# Patient Record
Sex: MALE | Race: WHITE | NOT HISPANIC OR LATINO | URBAN - METROPOLITAN AREA
[De-identification: names, ages, dates, MRNs, and addresses within clinical notes are randomized per-mention and may not be internally consistent; named-entity substitution may affect disease eponyms.]

---

## 2021-06-22 ENCOUNTER — OFFICE VISIT (OUTPATIENT)
Dept: FAMILY MEDICINE | Facility: CLINIC | Age: 21
End: 2021-06-22
Payer: COMMERCIAL

## 2021-06-22 VITALS
TEMPERATURE: 97.1 F | BODY MASS INDEX: 21.85 KG/M2 | OXYGEN SATURATION: 96 % | HEIGHT: 74 IN | WEIGHT: 170.25 LBS | RESPIRATION RATE: 13 BRPM | HEART RATE: 79 BPM | DIASTOLIC BLOOD PRESSURE: 79 MMHG | SYSTOLIC BLOOD PRESSURE: 123 MMHG

## 2021-06-22 DIAGNOSIS — Z11.59 ENCOUNTER FOR HEPATITIS C SCREENING TEST FOR LOW RISK PATIENT: ICD-10-CM

## 2021-06-22 DIAGNOSIS — Z11.3 SCREEN FOR STD (SEXUALLY TRANSMITTED DISEASE): ICD-10-CM

## 2021-06-22 DIAGNOSIS — Z13.220 SCREENING FOR LIPID DISORDERS: ICD-10-CM

## 2021-06-22 DIAGNOSIS — Z11.4 SCREENING FOR HIV (HUMAN IMMUNODEFICIENCY VIRUS): ICD-10-CM

## 2021-06-22 DIAGNOSIS — Z00.00 ANNUAL PHYSICAL EXAM: Primary | ICD-10-CM

## 2021-06-22 LAB
BUN SERPL-MCNC: 9 MG/DL (ref 5–24)
CALCIUM SERPL-MCNC: 9.6 MG/DL (ref 8.5–10.4)
CHLORIDE SERPLBLD-SCNC: 105 MMOL/L (ref 94–109)
CHOLEST SERPL-MCNC: 168 MG/DL (ref 0–200)
CHOLEST/HDLC SERPL: 2.7 {RATIO} (ref 0–5)
CO2 SERPL-SCNC: 31 MMOL/L (ref 20–32)
CREAT SERPL-MCNC: 0.9 MG/DL (ref 0.8–1.5)
EGFR CALCULATED (NON BLACK REFERENCE): 112.2
FASTING SPECIMEN: NO
GLUCOSE SERPL-MCNC: 105 MG/DL (ref 60–99)
HDLC SERPL-MCNC: 62 MG/DL
LDLC SERPL CALC-MCNC: 87 MG/DL (ref 0–129)
POTASSIUM SERPL-SCNC: 4.7 MMOL/L (ref 3.4–5.3)
SODIUM SERPL-SCNC: 140 MMOL/L (ref 137.3–146.3)
TRIGL SERPL-MCNC: 93 MG/DL (ref 0–150)
VLDL-CHOLESTEROL: 19 (ref 7–32)

## 2021-06-22 SDOH — HEALTH STABILITY: MENTAL HEALTH: HOW MANY STANDARD DRINKS CONTAINING ALCOHOL DO YOU HAVE ON A TYPICAL DAY?: 1 OR 2

## 2021-06-22 SDOH — HEALTH STABILITY: MENTAL HEALTH: HOW OFTEN DO YOU HAVE A DRINK CONTAINING ALCOHOL?: 2-3 TIMES A WEEK

## 2021-06-22 SDOH — HEALTH STABILITY: MENTAL HEALTH: HOW OFTEN DO YOU HAVE 6 OR MORE DRINKS ON ONE OCCASION?: WEEKLY

## 2021-06-22 ASSESSMENT — MIFFLIN-ST. JEOR: SCORE: 1842

## 2021-06-22 NOTE — PATIENT INSTRUCTIONS
"TRENTON Lloyd is a healthy 21 yo, here for an annual exam. His first visit to our clinic    ASSESSMENT/PLAN:    1. Discussed healthy lifestyle issues, e.g. Minimize alcohol use, try to eat veggies daily, exercise 5 days/week, including strength training at least 2 days/week.   2. Check labs  3. Sign up for the \"mychart\" system      Cam Sharma MD, MS  [facility] Department of Family Medicine and Community Health  "

## 2021-06-22 NOTE — PROGRESS NOTES
Jah Sandra presents to [facility] today to have an annual exam.  He reports no specific concerns.  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  He is currently entertaining some job offers.    Denies any chronic medical problems.    Lifestyle habits and Preventive health issues:     Physical activity - About 5 days/week. When he runs, can be up to 6 miles.     Diet - about 2-3 veggies/day. Often does not eat breakfast.     His sleep is good, 7-8 hours.     Alcohol intake involves about 3-4 nights/week and 2-3 drinks/night, occasionally more. He plans to decrease the number of nights drinking/week.     He does not use tobacco products. No THC      Wears seat belt.--Yes.  Wears bike helmet--Yes. .      MALE ROS  Heterosexual.   Partner: None now.   Contraception: Condoms  STD concerns: No concerns    His last dental check up was about 1.5 years ago. Suggested returning to Dentist soon.       ROS  PHQ-2 Score:     PHQ-2 ( 1999 Pfizer) 6/22/2021   Q1: Little interest or pleasure in doing things 0   Q2: Feeling down, depressed or hopeless 0   PHQ-2 Score 0       CONSTITUTIONAL:NEGATIVE for fever, chills, change in weight  INTEGUMENTARY/SKIN: NEGATIVE for worrisome rashes, moles or lesions  EYES: NEGATIVE for vision changes or irritation  ENT/MOUTH: NEGATIVE for ear, mouth and throat problems  RESP:NEGATIVE for significant cough or SOB  CV: NEGATIVE for chest pain, palpitations, SCHAEFFER, orthopnea, PND  or peripheral edema  GI: NEGATIVE for nausea, abdominal pain, heartburn, or change in bowel habits  :NEGATIVE for frequency, dysuria, or hematuria  MUSCULOSKELETAL:NEGATIVE for significant arthralgias or myalgia  NEURO: NEGATIVE for weakness, dizziness or paresthesias  ENDOCRINE: NEGATIVE for polyuria/dipsia,  temperature intolerance, skin/hair changes  HEME/ALLERGY/IMMUNE: NEGATIVE for bleeding problems  PSYCHIATRIC: NEGATIVE for changes in mood or  affect        No current outpatient medications on file.     Not on File    Health Maintenance   Topic Date Due     PREVENTIVE CARE VISIT  Never done     ADVANCE CARE PLANNING  Never done     HIV SCREENING  Never done     HPV IMMUNIZATION (3 - Male 3-dose series) 07/19/2014     HEPATITIS C SCREENING  Never done     DTAP/TDAP/TD IMMUNIZATION (5 - Td) 09/13/2026     PHQ-2  Completed     INFLUENZA VACCINE  Completed     IPV IMMUNIZATION  Completed     HEPATITIS B IMMUNIZATION  Completed     COVID-19 Vaccine  Completed     Pneumococcal Vaccine: Pediatrics (0 to 5 Years) and At-Risk Patients (6 to 64 Years)  Aged Out     MENINGITIS IMMUNIZATION  Aged Out         There is no problem list on file for this patient.      History reviewed. No pertinent surgical history.    Family History   Problem Relation Age of Onset     No Known Problems Mother      Multiple Sclerosis Father      No Known Problems Brother      Breast Cancer Maternal Grandmother      Heart Disease Paternal Grandfather      Diabetes No family hx of          Immunizations are as follows:      Immunization History   Administered Date(s) Administered     COVID-19,PF,Pfizer 04/19/2021, 05/13/2021     Comvax (HIB/HepB) 1998, 1998, 10/19/1999     DTAP (<7y) 1998, 1998, 01/18/1999, 10/19/1999, 08/25/2003     FLU 6-35 months 11/28/2008, 11/25/2011, 08/20/2012     HPV Quadrivalent 07/18/2013, 01/02/2014, 03/19/2014     HepA-ped 2 Dose 08/20/2012, 07/18/2013     Influenza (H1N1) 12/19/2009     Influenza Intranasal Vaccine 10/18/2007, 10/29/2009, 08/16/2010     Influenza Intranasal Vaccine 4 valent 09/08/2014     Influenza Vaccine IM > 6 months Valent IIV4 10/08/2013, 09/03/2015, 09/13/2016, 10/26/2017, 10/25/2018, 11/24/2020     MMR 10/19/1999, 07/29/2002     Meningococcal (Bexsero ) 09/03/2015, 09/13/2016     Meningococcal (Menactra ) 07/24/2009, 08/20/2012     Poliovirus, inactivated (IPV) 1998, 1998, 01/18/1999, 08/25/2003      "Tdap (Adacel,Boostrix) 08/25/2003, 07/24/2009, 09/13/2016     Varicella 08/18/2000, 07/24/2009           EXAM  /79 (BP Location: Right arm, Patient Position: Sitting, Cuff Size: Adult Regular)   Pulse 79   Temp 97.1  F (36.2  C) (Skin)   Resp 13   Ht 1.88 m (6' 2\")   Wt 77.2 kg (170 lb 4 oz)   SpO2 96%   BMI 21.86 kg/m    Physically fit and well-appearing 20-year-old in no distress    HEENT: Normal with clear oropharynx, normal tympanic membranes.  Neck was supple. No adenopathy, thyroid normal to palpation  RESP: lungs clear to auscultation bilaterally  Axillae: no palpable axillary masses or adenopathy  CV: regular rate and rhythm, normal S1 S2, no murmur, no carotid bruits  ABDOMEN: soft, nontender, without HSM or masses. Bowel sounds normal  : Normal male.  Testes down.  No masses.  No hernias.  Rectal exam: deferred  MS: Extremities normal- no gross deformities noted, no tender, hot or swollen joints.   SKIN: no suspicious lesions or rashes  NEURO: Normal strength and tone, sensory exam grossly normal  PSYCH: mentation appears normal. and affect normal/bright.  EXT: no peripheral edema        IMPRESSION  Gabino is a healthy 21 yo, here for an annual exam. His first visit to our clinic    ASSESSMENT/PLAN:    1. Discussed healthy lifestyle issues, e.g. Minimize alcohol use, try to eat veggies daily, exercise 5 days/week, including strength training at least 2 days/week.   2. Check labs  3. Sign up for the \"mychart\" system      Cam Sharma MD, MS  [facility] Department of Family Medicine and Community Health        "

## 2021-06-22 NOTE — NURSING NOTE
"20 year old  No chief complaint on file.      Blood pressure 123/79, pulse 79, temperature 97.1  F (36.2  C), temperature source Skin, resp. rate 13, height 1.88 m (6' 2\"), weight 77.2 kg (170 lb 4 oz), SpO2 96 %. Body mass index is 21.86 kg/m .  There is no problem list on file for this patient.      Wt Readings from Last 2 Encounters:   06/22/21 77.2 kg (170 lb 4 oz)     BP Readings from Last 3 Encounters:   06/22/21 123/79         No current outpatient medications on file.     No current facility-administered medications for this visit.        Social History     Tobacco Use     Smoking status: Never Smoker     Smokeless tobacco: Never Used   Substance Use Topics     Alcohol use: Yes     Frequency: 2-3 times a week     Drinks per session: 1 or 2     Binge frequency: Weekly     Drug use: Not Currently       Health Maintenance Due   Topic Date Due     PREVENTIVE CARE VISIT  Never done     ADVANCE CARE PLANNING  Never done     HIV SCREENING  Never done     HPV IMMUNIZATION (3 - Male 3-dose series) 07/19/2014     HEPATITIS C SCREENING  Never done       No results found for: PAP      June 22, 2021 2:26 PM    "

## 2021-06-23 LAB
C TRACH DNA SPEC QL NAA+PROBE: NEGATIVE
HCV AB SERPL QL IA: NONREACTIVE
HIV 1+2 AB+HIV1 P24 AG SERPL QL IA: NONREACTIVE
N GONORRHOEA DNA SPEC QL NAA+PROBE: NEGATIVE
SPECIMEN SOURCE: NORMAL
SPECIMEN SOURCE: NORMAL
T PALLIDUM AB SER QL: NONREACTIVE

## 2021-10-17 ENCOUNTER — HEALTH MAINTENANCE LETTER (OUTPATIENT)
Age: 21
End: 2021-10-17

## 2022-07-24 ENCOUNTER — HEALTH MAINTENANCE LETTER (OUTPATIENT)
Age: 22
End: 2022-07-24

## 2022-10-03 ENCOUNTER — HEALTH MAINTENANCE LETTER (OUTPATIENT)
Age: 22
End: 2022-10-03

## 2023-07-26 ENCOUNTER — OFFICE VISIT (OUTPATIENT)
Dept: FAMILY MEDICINE | Facility: CLINIC | Age: 23
End: 2023-07-26
Payer: COMMERCIAL

## 2023-07-26 VITALS
DIASTOLIC BLOOD PRESSURE: 80 MMHG | HEIGHT: 75 IN | OXYGEN SATURATION: 98 % | RESPIRATION RATE: 17 BRPM | TEMPERATURE: 98.4 F | HEART RATE: 73 BPM | WEIGHT: 161.5 LBS | BODY MASS INDEX: 20.08 KG/M2 | SYSTOLIC BLOOD PRESSURE: 128 MMHG

## 2023-07-26 DIAGNOSIS — I45.9 SKIPPED HEART BEATS: ICD-10-CM

## 2023-07-26 DIAGNOSIS — Z00.00 ROUTINE GENERAL MEDICAL EXAMINATION AT A HEALTH CARE FACILITY: Primary | ICD-10-CM

## 2023-07-26 ASSESSMENT — ENCOUNTER SYMPTOMS
CONSTIPATION: 0
PARESTHESIAS: 0
CHILLS: 0
NERVOUS/ANXIOUS: 0
HEADACHES: 0
HEMATURIA: 0
SHORTNESS OF BREATH: 0
DYSURIA: 0
SORE THROAT: 0
HEMATOCHEZIA: 0
ABDOMINAL PAIN: 0
DIARRHEA: 0
FREQUENCY: 0
JOINT SWELLING: 0
MYALGIAS: 0
WEAKNESS: 0
FEVER: 0
EYE PAIN: 0
NAUSEA: 0
HEARTBURN: 0
ARTHRALGIAS: 0
DIZZINESS: 0
PALPITATIONS: 0
COUGH: 0

## 2023-07-26 NOTE — PROGRESS NOTES
Jah Sandra presents to [facility] today to have an annual exam.     ASSESSMENT/PLAN:    Annual Exam/Preventive Issues   -Reviewed labs from 2021 with normal Lipids and chemistry panel. Glucose was 105 but he had not been fasting.   -Up to date on immunizations except for no records of HPV. He believes that he had this vaccine in childhood. He's now 22 yo and in a steady relationship. We discussed that vaccination is recommended until about age 25 yo so he's nearly there. We deferred today and he will check to see if he's had this vaccination in the past.     -Specific concerns:     Irregular heart beat with symptoms     - Reviewed ECG with Sinus rhythm at 64 but with some rate variation and non-specific QRS widening.   -Will assess with Ziopatch and then a visit with Cardiology. Referrals sent    -Follow up: in a year or sooner if needed.     Cam Sharma MD  Family Medicine and Sports Medicine    Introduction: This is my second time seeing Gabino.  The first was for an annual exam 2 years ago in June 2021.  At that time he was hoping to decrease his drinking. Has decreased it a bit.   He's feeling well except for noticing irregular heartbeats on occasions. States this was the worst in 2018-19 when he'd notice some skipped beats and then a racing heart. This is accompanied by lightheadedness. In past, he noticed that running helped minimize these events.         There is no problem list on file for this patient.      Current Medications include:   No current outpatient medications on file.     No Known Allergies    Social  Social History     Social History Narrative    From [place]. Went to [employer] then to [employer]. Majored in Mechanical Engineering.     Enjoys running or playing music         Lifestyle habits and Preventive health issues:     Physical activity Running and rock climbing about 3 times/week.   Diet is healthy    Alcohol intake involves about 2 nights/week and 3 drinks/night.    He  does not use tobacco products.   His sleep is good.       MALE ROS  Partner: Monserrat  Contraception: IUD  STD concerns: None. They are monogamous    Dental -- has an upcoming appt.     KAYE  PHQ-2 Score:         6/22/2021     2:22 PM   PHQ-2 ( 1999 Pfizer)   Q1: Little interest or pleasure in doing things 0   Q2: Feeling down, depressed or hopeless 0   PHQ-2 Score 0   PHQ-2 Total Score (12-17 Years)- Positive if 3 or more points; Administer PHQ-A if positive 0     Major other issues mentioned above. Otherwise, see patient intake questionnaire.       Health Maintenance   Topic Date Due    ADVANCE CARE PLANNING  Never done    HPV IMMUNIZATION (3 - Male 3-dose series) 07/19/2014    PHQ-2 (once per calendar year)  01/01/2023    INFLUENZA VACCINE (1) 09/01/2023    YEARLY PREVENTIVE VISIT  07/26/2024    DTAP/TDAP/TD IMMUNIZATION (8 - Td or Tdap) 09/13/2026    HEPATITIS C SCREENING  Completed    HIV SCREENING  Completed    IPV IMMUNIZATION  Completed    HEPATITIS B IMMUNIZATION  Completed    COVID-19 Vaccine  Completed    Pneumococcal Vaccine: Pediatrics (0 to 5 Years) and At-Risk Patients (6 to 64 Years)  Aged Out    MENINGITIS IMMUNIZATION  Aged Out         No past surgical history on file.    Family History   Problem Relation Age of Onset    No Known Problems Mother     Multiple Sclerosis Father     No Known Problems Brother     Breast Cancer Maternal Grandmother     Heart Disease Paternal Grandfather     Diabetes No family hx of          Immunizations are as follows:      Immunization History   Administered Date(s) Administered    COVID-19 Bivalent 18+ (Moderna) 11/30/2022    COVID-19 MONOVALENT 12+ (Pfizer) 04/19/2021, 05/13/2021    Comvax (HIB/HepB) 1998, 1998, 10/19/1999    DTAP (<7y) 1998, 1998, 01/18/1999, 10/19/1999, 08/25/2003    FLU 6-35 months 11/28/2008, 11/25/2011, 08/20/2012    HEPATITIS A (PEDS 12M-18Y) 08/20/2012, 07/18/2013    HPV Quadrivalent 07/18/2013, 01/02/2014, 03/19/2014     "Influenza (H1N1) 12/19/2009    Influenza Intranasal Vaccine 10/18/2007, 10/29/2009, 08/16/2010    Influenza Vaccine >6 months (Alfuria,Fluzone) 10/08/2013, 09/03/2015, 09/13/2016, 10/26/2017, 10/25/2018, 11/24/2020, 12/16/2021, 11/30/2022    MMR 10/19/1999, 07/29/2002    Meningococcal ACWY (Menactra ) 07/24/2009, 08/20/2012    Meningococcal B (Bexsero ) 09/03/2015, 09/13/2016    Nasal Influenza Vaccine 2-49 (FluMist) 09/08/2014    Poliovirus, inactivated (IPV) 1998, 1998, 01/18/1999, 08/25/2003    TDAP (Adacel,Boostrix) 08/25/2003, 07/24/2009, 09/13/2016    Varicella 08/18/2000, 07/24/2009         EXAM  /80 (BP Location: Left arm, Patient Position: Sitting, Cuff Size: Adult Regular)   Pulse 73   Temp 98.4  F (36.9  C) (Temporal)   Resp 17   Ht 1.892 m (6' 2.5\")   Wt 73.3 kg (161 lb 8 oz)   SpO2 98%   BMI 20.46 kg/m      GENERAL APPEARANCE: Appears well.   HEENT: neck is supple. No adenopathy, thyroid normal to palpation  RESP: Lungs clear to auscultation bilaterally.  Axillae: no palpable axillary masses or adenopathy  CV: regular rate and rhythm, normal S1 S2, no murmur, no carotid bruits  ABDOMEN: soft, nontender, without HSM or masses. Bowel sounds normal  : Normal male genitalia. No masses. No hernias  Rectal exam: deferred  MS: Gait - normal. Extremities with generally normal range of motion. No deformities noted other than Slightly limited IROM of both hips.   SKIN: No suspicious lesions or rashes  NEURO: Normal strength and tone, sensory exam grossly normal  PSYCH: mentation and affect appear normal.   EXT: No peripheral edema      ECG done in clinic today with NSR at 64 bpm.   Right axis deviation with some rate variation per computer read and also non-specific QRS widening.     SEE TOP OF NOTE FOR ASSESSMENT AND PLAN      Cam Sharma MD  Family Medicine and Sports Medicine  [facility] Department of Family Medicine and Community Health      Answers submitted by the " patient for this visit:  Annual Preventive Visit (Submitted on 7/26/2023)  Chief Complaint: Annual Exam:  Frequency of exercise:: 2-3 days/week.    Getting at least 3 servings of Calcium per day:: Yes  Diet:: Vegetarian/vegan  Taking medications regularly:: Yes  Medication side effects:: Not applicable  Bi-annual eye exam:: NO  Dental care twice a year:: NO  Sleep apnea or symptoms of sleep apnea:: None  abdominal pain: No  Blood in stool: No  Blood in urine: No  chest pain: No  chills: No  congestion: No  constipation: No  cough: No  diarrhea: No  dizziness: No  ear pain: No  eye pain: No  nervous/anxious: No  fever: No  frequency: No  genital sores: No  headaches: No  hearing loss: No  heartburn: No  arthralgias: No  joint swelling: No  peripheral edema: No  mood changes: No  myalgias: No  nausea: No  dysuria: No  palpitations: No  Skin sensation changes: No  sore throat: No  urgency: No  rash: No  shortness of breath: No  visual disturbance: No  weakness: No  impotence: No  penile discharge: No  Additional concerns today:: Yes  Exercise outside of work (Submitted on 7/26/2023)  Chief Complaint: Annual Exam:  Duration of exercise:: 45-60 minutes

## 2023-07-26 NOTE — NURSING NOTE
"23 year old  Chief Complaint   Patient presents with    Physical     --Has noticed over the past few years his heart will skip beats, would feel lightheaded at times. Started running after first experiencing sx 3 years ago, now noticing less skipped beats. Will still feel lightheaded sometimes with tachycardia.   --a couple of weeks ago left armpit lymph nodes swollen, about 1\" in size stayed for two weeks, has been gone for about a week.       Blood pressure 128/80, pulse 73, temperature 98.4  F (36.9  C), temperature source Temporal, resp. rate 17, height 1.892 m (6' 2.5\"), weight 73.3 kg (161 lb 8 oz), SpO2 98 %. Body mass index is 20.46 kg/m .  There is no problem list on file for this patient.      Wt Readings from Last 2 Encounters:   07/26/23 73.3 kg (161 lb 8 oz)   06/22/21 77.2 kg (170 lb 4 oz)     BP Readings from Last 3 Encounters:   07/26/23 128/80   06/22/21 123/79         No current outpatient medications on file.     No current facility-administered medications for this visit.       Social History     Tobacco Use    Smoking status: Never    Smokeless tobacco: Never   Substance Use Topics    Alcohol use: Yes     Alcohol/week: 5.0 standard drinks of alcohol     Types: 5 Standard drinks or equivalent per week    Drug use: Not Currently       Health Maintenance Due   Topic Date Due    ADVANCE CARE PLANNING  Never done    HPV IMMUNIZATION (3 - Male 3-dose series) 07/19/2014    YEARLY PREVENTIVE VISIT  06/22/2022    PHQ-2 (once per calendar year)  01/01/2023       No results found for: PAP      July 26, 2023 9:52 AM    "

## 2023-07-26 NOTE — PATIENT INSTRUCTIONS
Annual Exam/Preventive Issues   -Reviewed labs from 2021 with normal Lipids and chemistry panel. Glucose was 105 but he had not been fasting.   -Up to date on immunizations except for no records of HPV. He believes that he had this vaccine in childhood. He's now 24 yo and in a steady relationship. We discussed that vaccination is recommended until about age 25 yo so he's nearly there. We deferred today and he will check to see if he's had this vaccination in the past.     -Specific concerns:     Irregular heart beat with symptoms     - Reviewed ECG with Sinus rhythm at 64 but with some rate variation and non-specific QRS widening.   -Will assess with Ziopatch and then a visit with Cardiology. Referrals sent    -Follow up: in a year or sooner if needed.     Cam Sharma MD  Family Medicine and Sports Medicine      Preventive Health Recommendations  Male Ages 21 - 25     Yearly exam:             See your health care provider every year in order to  o   Review health changes.   o   Discuss preventive care.    o   Review your medicines if your doctor has prescribed any.  You should be tested each year for STDs (sexually transmitted diseases).   Talk to your provider about cholesterol testing.    If you are at risk for diabetes, you should have a diabetes test (fasting glucose).    Shots: Get a flu shot each year. Get a tetanus shot every 10 years.     Nutrition:  Eat at least 5 servings of fruits and vegetables daily.   Eat whole-grain bread, whole-wheat pasta and brown rice instead of white grains and rice.   Get adequate calcium and Vitamin D.     Lifestyle  Exercise for at least 150 minutes a week (30 minutes a day, 5 days a week). This will help you control your weight and prevent disease.   Limit alcohol to one drink per day.   No smoking.   Wear sunscreen to prevent skin cancer.   See your dentist every six months for an exam and cleaning.

## 2023-07-31 ENCOUNTER — HOSPITAL ENCOUNTER (OUTPATIENT)
Dept: CARDIOLOGY | Facility: CLINIC | Age: 23
Discharge: HOME | End: 2023-07-31
Attending: FAMILY MEDICINE | Admitting: FAMILY MEDICINE
Payer: COMMERCIAL

## 2023-07-31 DIAGNOSIS — I45.9 SKIPPED HEART BEATS: ICD-10-CM

## 2023-07-31 PROCEDURE — 93242 EXT ECG>48HR<7D RECORDING: CPT

## 2023-07-31 PROCEDURE — 93248 EXT ECG>7D<15D REV&INTERPJ: CPT | Performed by: FAMILY MEDICINE

## 2023-08-28 NOTE — PROGRESS NOTES
[facility]      Referring provider:Cam Sharma MD     HPI: Mr. Jah Sandra is a 23 year old  male with no significant past medical history is being seen today for irregular heartbeat sensation over the last 1 year or so.  Patient tells me that he first noticed irregular heartbeat ~ 5 to 6 years ago.  Then for few years he was feeling fine and did not notice them much.  Over the last 1 year he started feeling the skipped beats again.  Mostly at rest.  No specific triggers per patient.  He exercises regularly particular rockclimbing.  No exercise associated palpitations.  No chest pain, shortness of breath, dizziness, syncope or lower extremity edema.  Denies fast palpitations.    Does not smoke.  Alcohol 1-2 times a week 3-4 drinks.  No drug abuse.  He is not on any medications other than Zyrtec.  No cardiovascular risk factors.  No family history of premature CAD.     I reviewed patient's EKG 7/26/2023 which shows sinus rhythm, right axis deviation, with QRS duration slightly delayed at 160 ms.  HI interval is normal.  No preexcitation.    Reviewed patient's Zio patch for a week (7/31/2023) which shows symptoms corresponding to sinus rhythm with normal rate.  No abnormal cardiac arrhythmia tacky or bradycardia arrhythmia.    Medications, personal, family, and social history reviewed with patient and revised.    PAST MEDICAL HISTORY:  No past medical history on file.    CURRENT MEDICATIONS:  No current outpatient medications on file.       PAST SURGICAL HISTORY:  No past surgical history on file.    ALLERGIES:   No Known Allergies    FAMILY HISTORY:  Family History   Problem Relation Age of Onset    No Known Problems Mother     Multiple Sclerosis Father     No Known Problems Brother     Breast Cancer Maternal Grandmother     Heart Disease Paternal Grandfather     Diabetes No family hx of           SOCIAL HISTORY:  Social History     Tobacco Use    Smoking status: Never    Smokeless tobacco: Never   Substance Use Topics    Alcohol use: Yes     Alcohol/week: 5.0 standard drinks of alcohol     Types: 5 Standard drinks or equivalent per week    Drug use: Not Currently       ROS:   Constitutional: No fever, chills, or sweats. Weight stable.   Cardiovascular: As per HPI.       Exam:  /73 (BP Location: Left arm, Patient Position: Sitting, Cuff Size: Adult Regular)   Pulse 69   Wt 76.9 kg (169 lb 9.6 oz)   SpO2 96%   BMI 21.48 kg/m    GENERAL APPEARANCE: alert and no distress  HEENT: no icterus, no central cyanosis  LYMPH/NECK: no adenopathy, no asymmetry, JVP not elevated  RESPIRATORY: lungs clear to auscultation - no rales, rhonchi or wheezes, no use of accessory muscles, no retractions, respirations are unlabored, normal respiratory rate  CARDIOVASCULAR: regular rhythm, normal S1, S2, no S3 or S4 and no murmur, click or rub, precordium quiet with normal PMI.  GI: soft, non tender  EXTREMITIES: no edema  NEURO: alert, normal speech,and affect  SKIN: no ecchymoses, no rashes     I have reviewed the labs and personally reviewed the imaging below and made my comment in the assessment and plan.    Labs:  CBC RESULTS:   No results found for: WBC, RBC, HGB, HCT, MCV, MCH, MCHC, RDW, PLT    BMP RESULTS:  Lab Results   Component Value Date    .0 06/22/2021    POTASSIUM 4.7 06/22/2021    CHLORIDE 105.0 06/22/2021    CO2 31.0 06/22/2021    .0 (H) 06/22/2021    BUN 9.0 06/22/2021    CR 0.9 06/22/2021    SILVER 9.6 06/22/2021      Zio patch 7/31/2023: Normal.  Symptoms correspond to sinus rhythm    EKG 7/26/2023 shows sinus rhythm, right axis deviation, increased QRS duration at 116 ms    Echocardiogram  No results found for this or any previous visit (from the past 8760 hour(s)).      Assessment and Plan:     #Irregular heartbeat sensation  #Abnormal EKG  -No specific trigger for skipped beat  sensation. Zio patch shows sinus rhythm corresponding to symptoms.  PAC and PVC less than 1%.  No significant cardiac arrhythmia.  I reassured patient that his physical exam and Zio patch is normal.  I recommended transthoracic echocardiogram and updating blood tests.  In the meantime I also counseled him to avoid alcohol for a while and see if that helps to relieve his skipped feeling sensation.      Return to clinic as needed.      Total time spent today for this visit is 30 minutes including precharting, face-to-face clinic visit, review of labs/imaging and medical documentation.    Alfredito VELARDE MD  [facility] Division of Cardiology  Pager [phone]

## 2023-08-29 ENCOUNTER — OFFICE VISIT (OUTPATIENT)
Dept: CARDIOLOGY | Facility: CLINIC | Age: 23
End: 2023-08-29
Payer: COMMERCIAL

## 2023-08-29 VITALS
BODY MASS INDEX: 21.48 KG/M2 | OXYGEN SATURATION: 96 % | WEIGHT: 169.6 LBS | DIASTOLIC BLOOD PRESSURE: 73 MMHG | SYSTOLIC BLOOD PRESSURE: 112 MMHG | HEART RATE: 69 BPM

## 2023-08-29 DIAGNOSIS — R94.31 ABNORMAL ELECTROCARDIOGRAM: ICD-10-CM

## 2023-08-29 DIAGNOSIS — I45.9 SKIPPED HEART BEATS: Primary | ICD-10-CM

## 2023-08-29 LAB
ANION GAP SERPL CALCULATED.3IONS-SCNC: 12 MMOL/L (ref 7–15)
BUN SERPL-MCNC: 8.6 MG/DL (ref 6–20)
CALCIUM SERPL-MCNC: 9.3 MG/DL (ref 8.6–10)
CHLORIDE SERPL-SCNC: 102 MMOL/L (ref 98–107)
CREAT SERPL-MCNC: 0.78 MG/DL (ref 0.67–1.17)
DEPRECATED HCO3 PLAS-SCNC: 24 MMOL/L (ref 22–29)
ERYTHROCYTE [DISTWIDTH] IN BLOOD BY AUTOMATED COUNT: 11.9 % (ref 10–15)
GFR SERPL CREATININE-BSD FRML MDRD: >90 ML/MIN/1.73M2
GLUCOSE SERPL-MCNC: 82 MG/DL (ref 70–99)
HCT VFR BLD AUTO: 43.4 % (ref 40–53)
HGB BLD-MCNC: 14.4 G/DL (ref 13.3–17.7)
MCH RBC QN AUTO: 29.6 PG (ref 26.5–33)
MCHC RBC AUTO-ENTMCNC: 33.2 G/DL (ref 31.5–36.5)
MCV RBC AUTO: 89 FL (ref 78–100)
PLATELET # BLD AUTO: 242 10E3/UL (ref 150–450)
POTASSIUM SERPL-SCNC: 4.8 MMOL/L (ref 3.4–5.3)
RBC # BLD AUTO: 4.86 10E6/UL (ref 4.4–5.9)
SODIUM SERPL-SCNC: 138 MMOL/L (ref 136–145)
TSH SERPL DL<=0.005 MIU/L-ACNC: 0.92 UIU/ML (ref 0.3–4.2)
WBC # BLD AUTO: 4.8 10E3/UL (ref 4–11)

## 2023-08-29 PROCEDURE — 85027 COMPLETE CBC AUTOMATED: CPT | Performed by: INTERNAL MEDICINE

## 2023-08-29 PROCEDURE — 36415 COLL VENOUS BLD VENIPUNCTURE: CPT | Performed by: INTERNAL MEDICINE

## 2023-08-29 PROCEDURE — 99203 OFFICE O/P NEW LOW 30 MIN: CPT | Performed by: INTERNAL MEDICINE

## 2023-08-29 PROCEDURE — 84443 ASSAY THYROID STIM HORMONE: CPT | Performed by: INTERNAL MEDICINE

## 2023-08-29 PROCEDURE — 80048 BASIC METABOLIC PNL TOTAL CA: CPT | Performed by: INTERNAL MEDICINE

## 2023-08-29 NOTE — NURSING NOTE
"Chief Complaint   Patient presents with    New Patient     Reason for visit: New general cardiology for Skipped heart beats       Initial /73 (BP Location: Left arm, Patient Position: Sitting, Cuff Size: Adult Regular)   Pulse 69   Wt 76.9 kg (169 lb 9.6 oz)   SpO2 96%   BMI 21.48 kg/m   Estimated body mass index is 21.48 kg/m  as calculated from the following:    Height as of 7/26/23: 1.892 m (6' 2.5\").    Weight as of this encounter: 76.9 kg (169 lb 9.6 oz)..  BP completed using cuff size: regular    LADAN Hernandez    "

## 2023-08-29 NOTE — LETTER
8/29/2023      RE: Jah Sandra  [address]  [address]  [facility] [address]       Dear Colleague,    Thank you for the opportunity to participate in the care of your patient, Jah Sandra, at the [facility]. Please see a copy of my visit note below.                                                                                              [facility] Cardiology [facility]      Referring provider:Cam Sharma MD     HPI: Mr. Jah Sandra is a 23 year old  male with no significant past medical history is being seen today for irregular heartbeat sensation over the last 1 year or so.  Patient tells me that he first noticed irregular heartbeat ~ 5 to 6 years ago.  Then for few years he was feeling fine and did not notice them much.  Over the last 1 year he started feeling the skipped beats again.  Mostly at rest.  No specific triggers per patient.  He exercises regularly particular rockclimbing.  No exercise associated palpitations.  No chest pain, shortness of breath, dizziness, syncope or lower extremity edema.  Denies fast palpitations.    Does not smoke.  Alcohol 1-2 times a week 3-4 drinks.  No drug abuse.  He is not on any medications other than Zyrtec.  No cardiovascular risk factors.  No family history of premature CAD.     I reviewed patient's EKG 7/26/2023 which shows sinus rhythm, right axis deviation, with QRS duration slightly delayed at 160 ms.  LA interval is normal.  No preexcitation.    Reviewed patient's Zio patch for a week (7/31/2023) which shows symptoms corresponding to sinus rhythm with normal rate.  No abnormal cardiac arrhythmia tacky or bradycardia arrhythmia.    Medications, personal, family, and social history reviewed with patient and revised.    PAST MEDICAL HISTORY:  No past medical history on file.    CURRENT MEDICATIONS:  No current outpatient medications on file.       PAST  SURGICAL HISTORY:  No past surgical history on file.    ALLERGIES:   No Known Allergies    FAMILY HISTORY:  Family History   Problem Relation Age of Onset    No Known Problems Mother     Multiple Sclerosis Father     No Known Problems Brother     Breast Cancer Maternal Grandmother     Heart Disease Paternal Grandfather     Diabetes No family hx of          SOCIAL HISTORY:  Social History     Tobacco Use    Smoking status: Never    Smokeless tobacco: Never   Substance Use Topics    Alcohol use: Yes     Alcohol/week: 5.0 standard drinks of alcohol     Types: 5 Standard drinks or equivalent per week    Drug use: Not Currently       ROS:   Constitutional: No fever, chills, or sweats. Weight stable.   Cardiovascular: As per HPI.       Exam:  /73 (BP Location: Left arm, Patient Position: Sitting, Cuff Size: Adult Regular)   Pulse 69   Wt 76.9 kg (169 lb 9.6 oz)   SpO2 96%   BMI 21.48 kg/m    GENERAL APPEARANCE: alert and no distress  HEENT: no icterus, no central cyanosis  LYMPH/NECK: no adenopathy, no asymmetry, JVP not elevated  RESPIRATORY: lungs clear to auscultation - no rales, rhonchi or wheezes, no use of accessory muscles, no retractions, respirations are unlabored, normal respiratory rate  CARDIOVASCULAR: regular rhythm, normal S1, S2, no S3 or S4 and no murmur, click or rub, precordium quiet with normal PMI.  GI: soft, non tender  EXTREMITIES: no edema  NEURO: alert, normal speech,and affect  SKIN: no ecchymoses, no rashes     I have reviewed the labs and personally reviewed the imaging below and made my comment in the assessment and plan.    Labs:  CBC RESULTS:   No results found for: WBC, RBC, HGB, HCT, MCV, MCH, MCHC, RDW, PLT    BMP RESULTS:  Lab Results   Component Value Date    .0 06/22/2021    POTASSIUM 4.7 06/22/2021    CHLORIDE 105.0 06/22/2021    CO2 31.0 06/22/2021    .0 (H) 06/22/2021    BUN 9.0 06/22/2021    CR 0.9 06/22/2021    SILVER 9.6 06/22/2021      Zio patch 7/31/2023:  Normal.  Symptoms correspond to sinus rhythm    EKG 7/26/2023 shows sinus rhythm, right axis deviation, increased QRS duration at 116 ms    Echocardiogram  No results found for this or any previous visit (from the past 8760 hour(s)).      Assessment and Plan:     #Irregular heartbeat sensation  #Abnormal EKG  -No specific trigger for skipped beat sensation. Zio patch shows sinus rhythm corresponding to symptoms.  PAC and PVC less than 1%.  No significant cardiac arrhythmia.  I reassured patient that his physical exam and Zio patch is normal.  I recommended transthoracic echocardiogram and updating blood tests.  In the meantime I also counseled him to avoid alcohol for a while and see if that helps to relieve his skipped feeling sensation.      Return to clinic as needed.      Total time spent today for this visit is 30 minutes including precharting, face-to-face clinic visit, review of labs/imaging and medical documentation.    Alfredito VELARDE MD  [facility] Division of Cardiology  Pager [phone]

## 2023-08-29 NOTE — PATIENT INSTRUCTIONS
Thank you for coming to the [facility] at [place]; please note the following instructions:    1. Echocardiogram    2. Blood test today    3. Follow-up as needed in cardiology        If you have any questions regarding your visit, please contact your care team:     CARDIOLOGY  TELEPHONE NUMBER   Rubia MTZIsabel, Registered Nurse  Siobhan BERRY, Registered Nurse  Nell SHIRLEY, Registered Nurse  Veronica ERWIN, Registered Medical Assistant  Meena TORRES, Certified Medical Assistant  Kina FLORES, Visit Facilitator [phone] (select option 1)    *After hours: [phone]   For Scheduling Appts:     [phone] (select option 1)    *After hours: [phone]   For the Device Clinic (Pacemakers and ICD's)  Laurie TAM, Registered Nurse   During business hours: [phone]    *After business hours:  [phone] (select option 4)      Normal test result notifications will be released via M[employer] or mailed within 7 business days.  All other test results, will be communicated via telephone once reviewed by your cardiologist.    If you need a medication refill, please contact your pharmacy.  Please allow 3 business days for your refill to be completed.    As always, thank you for trusting us with your health care needs!

## 2023-08-29 NOTE — LETTER
August 30, 2023      Gabino Sandra  [address]  [address]  [facility] [address]        Dear ,    We are writing to inform you of your test results. Your test results fall within the expected range(s) or remain unchanged from previous results.  Please continue with current treatment plan.    Resulted Orders   CBC with platelets   Result Value Ref Range    WBC Count 4.8 4.0 - 11.0 10e3/uL    RBC Count 4.86 4.40 - 5.90 10e6/uL    Hemoglobin 14.4 13.3 - 17.7 g/dL    Hematocrit 43.4 40.0 - 53.0 %    MCV 89 78 - 100 fL    MCH 29.6 26.5 - 33.0 pg    MCHC 33.2 31.5 - 36.5 g/dL    RDW 11.9 10.0 - 15.0 %    Platelet Count 242 150 - 450 10e3/uL   TSH with free T4 reflex   Result Value Ref Range    TSH 0.92 0.30 - 4.20 uIU/mL   Basic metabolic panel   Result Value Ref Range    Sodium 138 136 - 145 mmol/L    Potassium 4.8 3.4 - 5.3 mmol/L    Chloride 102 98 - 107 mmol/L    Carbon Dioxide (CO2) 24 22 - 29 mmol/L    Anion Gap 12 7 - 15 mmol/L    Urea Nitrogen 8.6 6.0 - 20.0 mg/dL    Creatinine 0.78 0.67 - 1.17 mg/dL    Calcium 9.3 8.6 - 10.0 mg/dL    Glucose 82 70 - 99 mg/dL    GFR Estimate >90 >60 mL/min/1.73m2   If you have any questions or concerns, please call the clinic at the number listed above.   Sincerely,  Alfreidto Peralta MD

## 2023-09-22 ENCOUNTER — ANCILLARY PROCEDURE (OUTPATIENT)
Dept: CARDIOLOGY | Facility: CLINIC | Age: 23
End: 2023-09-22
Attending: INTERNAL MEDICINE
Payer: COMMERCIAL

## 2023-09-22 DIAGNOSIS — R94.31 ABNORMAL ELECTROCARDIOGRAM: ICD-10-CM

## 2023-09-22 LAB — LVEF ECHO: NORMAL

## 2023-09-22 PROCEDURE — 93306 TTE W/DOPPLER COMPLETE: CPT | Mod: GC | Performed by: INTERNAL MEDICINE

## 2024-10-05 ENCOUNTER — HEALTH MAINTENANCE LETTER (OUTPATIENT)
Age: 24
End: 2024-10-05

## 2024-10-23 ENCOUNTER — OFFICE VISIT (OUTPATIENT)
Dept: FAMILY MEDICINE | Facility: CLINIC | Age: 24
End: 2024-10-23
Payer: COMMERCIAL

## 2024-10-23 VITALS
DIASTOLIC BLOOD PRESSURE: 69 MMHG | HEIGHT: 74 IN | WEIGHT: 170 LBS | SYSTOLIC BLOOD PRESSURE: 115 MMHG | TEMPERATURE: 98.3 F | RESPIRATION RATE: 14 BRPM | OXYGEN SATURATION: 99 % | BODY MASS INDEX: 21.82 KG/M2 | HEART RATE: 61 BPM

## 2024-10-23 DIAGNOSIS — Z00.00 ANNUAL PHYSICAL EXAM: Primary | ICD-10-CM

## 2024-10-23 SDOH — HEALTH STABILITY: PHYSICAL HEALTH: ON AVERAGE, HOW MANY DAYS PER WEEK DO YOU ENGAGE IN MODERATE TO STRENUOUS EXERCISE (LIKE A BRISK WALK)?: 5 DAYS

## 2024-10-23 ASSESSMENT — SOCIAL DETERMINANTS OF HEALTH (SDOH): HOW OFTEN DO YOU GET TOGETHER WITH FRIENDS OR RELATIVES?: TWICE A WEEK

## 2024-10-23 NOTE — PATIENT INSTRUCTIONS
IMPRESSION  Healthy 25 yo doing well.     ASSESSMENT/PLAN:    Annual Exam/Preventive Issues   - Labs: Reviewed healthy labs from the past 2 years  - Immunizations: Up to date on all except Covid. He's had 4 previous covid vaccines, plus has had Covid in the past on a few occasions. Will defer  - Other recommendations:   Start taking Vit B12 as vegan diet primarily. Can be part of a multivitamin  Encouraged outdoor activity in wintertime  Schedule dentistry visit.       -Follow up: in 1-2 years.     Cam Sharma MD  Family Medicine and Sports Medicine

## 2024-10-23 NOTE — PROGRESS NOTES
Preventive Care Visit  [facility]      IMPRESSION  Healthy 25 yo doing well.     ASSESSMENT/PLAN:    Annual Exam/Preventive Issues   - Labs: Reviewed healthy labs from the past 2 years  - Immunizations: Up to date on all except Covid. He's had 4 previous covid vaccines, plus has had Covid in the past on a few occasions. Will defer  - Other recommendations:   Start taking Vit B12 as vegan diet primarily. Can be part of a multivitamin  Encouraged outdoor activity in wintertime  Schedule dentistry visit.     ADDENDUM:   I sent in a message about family-planning or contraception as it seemed he had questions about this on his questionnaire but he did not raise it during the visit.    -Follow up: in 1-2 years.     Cam Sharma MD  Family Medicine and Sports Medicine      INTRODUCTION:   Jah is here for an annual preventive exam.     He states that things are going well.  He and his longtime girlfriend have now moved into a new home together.  He is under some stress as he is changing jobs.  The heart palpitations that he had previously have all resolved.      Social History     Social History Narrative    From [place]. Went to [employer] then to [employer]. 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     Was working at [employer]. 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     Has a longtime girlfriend, Monserrat. They just bought a home together.     Enjoys running, biking and playing music.           10/23/2024   General Health   How would you rate your overall physical health? Good   Feel stress (tense, anxious, or unable to sleep) Rather much      (!) STRESS CONCERN      10/23/2024   Nutrition   Three or more servings of calcium each day? Yes   Diet: Vegetarian/vegan   How many servings of fruit and vegetables per day? (!) 2-3   How many sweetened beverages each day? 0-1            10/23/2024   Exercise   Days per week of moderate/strenous exercise 5 days            10/23/2024   Social Factors   Frequency of  gathering with friends or relatives Twice a week   Worry food won't last until get money to buy more No   Food not last or not have enough money for food? No   Do you have housing? (Housing is defined as stable permanent housing and does not include staying ouside in a car, in a tent, in an abandoned building, in an overnight shelter, or couch-surfing.) Yes   Are you worried about losing your housing? No   Lack of transportation? No   Unable to get utilities (heat,electricity)? No            10/23/2024   Dental   Dentist two times every year? (!) NO            10/23/2024   TB Screening   Were you born outside of the [place]? No        Today's PHQ-2 Score:       10/23/2024     7:49 AM   PHQ-2 ( 1999 Pf[employer])   Q1: Little interest or pleasure in doing things 1    Q2: Feeling down, depressed or hopeless 1    PHQ-2 Score 2    Q1: Little interest or pleasure in doing things Several days   Q2: Feeling down, depressed or hopeless Several days   PHQ-2 Score 2       Patient-reported   States that under stress as switching jobs. Nothing else unusual.         10/23/2024   Substance Use   Alcohol more than 3/day or more than 7/wk No   Do you use any other substances recreationally? (!) CANNABIS PRODUCTS        Social History     Tobacco Use    Smoking status: Never    Smokeless tobacco: Never   Substance Use Topics    Alcohol use: Yes     Alcohol/week: 3.0 standard drinks of alcohol     Types: 3 Standard drinks or equivalent per week    Drug use: Not Currently           10/23/2024   STI Screening   New sexual partner(s) since last STI/HIV test? No            10/23/2024   Contraception/Family Planning   Questions about contraception or family planning (!) YES. *    *Noticed after visit. I will send him a message about this.        Reviewed and updated as needed this visit by Provider                       Objective    Exam  /69 (BP Location: Left arm, Patient Position: Sitting, Cuff Size: Adult Regular)   Pulse 61   Temp 98.3  " F (36.8  C) (Skin)   Resp 14   Ht 1.892 m (6' 2.49\")   Wt 77.1 kg (170 lb)   SpO2 99%   BMI 21.54 kg/m     Estimated body mass index is 21.54 kg/m  as calculated from the following:    Height as of this encounter: 1.892 m (6' 2.49\").    Weight as of this encounter: 77.1 kg (170 lb).    Physical Exam  GENERAL: alert and no distress  EYES: Eyes grossly normal to inspection  HENT: ear canals and TM's normal, nose and mouth without ulcers or lesions  NECK: no adenopathy, no asymmetry, masses, or scars  RESP: lungs clear to auscultation - no rales, rhonchi or wheezes  CV: regular rate and rhythm, normal S1 S2, no S3 or S4, no murmur, click or rub, no peripheral edema  ABDOMEN: soft, nontender, no hepatosplenomegaly, no masses and bowel sounds normal  MS: no gross musculoskeletal defects noted, no edema  SKIN: no suspicious lesions or rashes  NEURO: Normal strength and tone, mentation intact and speech normal  PSYCH: mentation appears normal, affect normal/bright        Signed Electronically by: Cam Sharma MD    "

## 2024-10-23 NOTE — NURSING NOTE
"24 year old  Chief Complaint   Patient presents with    Physical     No concerns.        Blood pressure 115/69, pulse 61, temperature 98.3  F (36.8  C), temperature source Skin, resp. rate 14, height 1.892 m (6' 2.49\"), weight 77.1 kg (170 lb), SpO2 99%. Body mass index is 21.54 kg/m .  There is no problem list on file for this patient.      Wt Readings from Last 2 Encounters:   10/23/24 77.1 kg (170 lb)   08/29/23 76.9 kg (169 lb 9.6 oz)     BP Readings from Last 3 Encounters:   10/23/24 115/69   08/29/23 112/73   07/26/23 128/80         No current outpatient medications on file.     No current facility-administered medications for this visit.       Social History     Tobacco Use    Smoking status: Never    Smokeless tobacco: Never   Substance Use Topics    Alcohol use: Yes     Alcohol/week: 3.0 standard drinks of alcohol     Types: 3 Standard drinks or equivalent per week    Drug use: Not Currently       Health Maintenance Due   Topic Date Due    ADVANCE CARE PLANNING  Never done    HPV IMMUNIZATION (3 - Male 3-dose series) 06/11/2014    YEARLY PREVENTIVE VISIT  07/26/2024    INFLUENZA VACCINE (1) 09/01/2024    COVID-19 Vaccine (5 - 2024-25 season) 09/01/2024       No results found for: \"PAP\"      October 23, 2024 7:55 AM   "